# Patient Record
Sex: OTHER/UNKNOWN | Race: BLACK OR AFRICAN AMERICAN | NOT HISPANIC OR LATINO | URBAN - METROPOLITAN AREA
[De-identification: names, ages, dates, MRNs, and addresses within clinical notes are randomized per-mention and may not be internally consistent; named-entity substitution may affect disease eponyms.]

---

## 2017-08-14 ENCOUNTER — APPOINTMENT (OUTPATIENT)
Age: 52
Setting detail: DERMATOLOGY
End: 2017-08-15

## 2017-08-14 VITALS — WEIGHT: 163 LBS | HEIGHT: 63 IN

## 2017-08-14 DIAGNOSIS — L259 CONTACT DERMATITIS AND OTHER ECZEMA, UNSPECIFIED CAUSE: ICD-10-CM

## 2017-08-14 PROBLEM — F32.9 MAJOR DEPRESSIVE DISORDER, SINGLE EPISODE, UNSPECIFIED: Status: ACTIVE | Noted: 2017-08-14

## 2017-08-14 PROBLEM — L23.9 ALLERGIC CONTACT DERMATITIS, UNSPECIFIED CAUSE: Status: ACTIVE | Noted: 2017-08-14

## 2017-08-14 PROBLEM — F41.9 ANXIETY DISORDER, UNSPECIFIED: Status: ACTIVE | Noted: 2017-08-14

## 2017-08-14 PROBLEM — I10 ESSENTIAL (PRIMARY) HYPERTENSION: Status: ACTIVE | Noted: 2017-08-14

## 2017-08-14 PROBLEM — J45.909 UNSPECIFIED ASTHMA, UNCOMPLICATED: Status: ACTIVE | Noted: 2017-08-14

## 2017-08-14 PROCEDURE — 99203 OFFICE O/P NEW LOW 30 MIN: CPT

## 2017-08-14 PROCEDURE — OTHER REASSURANCE: OTHER

## 2017-08-14 PROCEDURE — OTHER COUNSELING: OTHER

## 2017-08-14 PROCEDURE — OTHER PRESCRIPTION: OTHER

## 2017-08-14 PROCEDURE — OTHER MIPS QUALITY: OTHER

## 2017-08-14 PROCEDURE — OTHER TREATMENT REGIMEN: OTHER

## 2017-08-14 RX ORDER — BETAMETHASONE DIPROPIONATE 0.5 MG/G
1 CREAM TOPICAL
Qty: 2 | Refills: 3 | Status: ERX | COMMUNITY
Start: 2017-08-14

## 2017-08-14 ASSESSMENT — LOCATION DETAILED DESCRIPTION DERM
LOCATION DETAILED: RIGHT DISTAL POSTERIOR UPPER ARM
LOCATION DETAILED: RIGHT PROXIMAL DORSAL FOREARM
LOCATION DETAILED: LEFT PROXIMAL POSTERIOR UPPER ARM
LOCATION DETAILED: LEFT DISTAL POSTERIOR UPPER ARM
LOCATION DETAILED: RIGHT PROXIMAL RADIAL DORSAL FOREARM
LOCATION DETAILED: RIGHT PROXIMAL POSTERIOR UPPER ARM
LOCATION DETAILED: LEFT PROXIMAL DORSAL FOREARM
LOCATION DETAILED: LEFT DISTAL DORSAL FOREARM

## 2017-08-14 ASSESSMENT — LOCATION ZONE DERM: LOCATION ZONE: ARM

## 2017-08-14 ASSESSMENT — BSA RASH: BSA RASH: 30

## 2017-08-14 ASSESSMENT — SEVERITY ASSESSMENT: SEVERITY: MILD

## 2017-08-14 ASSESSMENT — LOCATION SIMPLE DESCRIPTION DERM
LOCATION SIMPLE: RIGHT POSTERIOR UPPER ARM
LOCATION SIMPLE: LEFT FOREARM
LOCATION SIMPLE: RIGHT FOREARM
LOCATION SIMPLE: LEFT POSTERIOR UPPER ARM

## 2017-08-14 ASSESSMENT — PAIN INTENSITY VAS: HOW INTENSE IS YOUR PAIN 0 BEING NO PAIN, 10 BEING THE MOST SEVERE PAIN POSSIBLE?: NO PAIN

## 2017-08-14 NOTE — HPI: RASH
How Severe Is Your Rash?: mild
Is This A New Presentation, Or A Follow-Up?: Rash
Additional History: Patient had a breakout in a rash and was only given mupirocin and was told it can be from phosphate levels from eating processed lunch meats. Patient goes to dialysis 3x a week. Rash and outbreak occurred prior to starting treatment. Patient is very itchy.

## 2017-08-14 NOTE — PROCEDURE: MIPS QUALITY
Quality 226: Preventive Care And Screening: Tobacco Use: Screening And Cessation Intervention: Patient screened for tobacco and never smoked
Quality 130: Documentation Of Current Medications In The Medical Record: Current Medications Documented
Detail Level: Detailed
Quality 110: Preventive Care And Screening: Influenza Immunization: Influenza Immunization Administered during Influenza season

## 2017-08-14 NOTE — PROCEDURE: TREATMENT REGIMEN
Initiate Treatment: Betamethasone cream apply to affected areas twice a day x14 days then once a day x7 days \\nMoisturize over with a non scented lotion
Otc Regimen: Claritin in the morning, Zyrtec at night
Detail Level: Detailed
Samples Given: Topicort spray